# Patient Record
Sex: MALE | Race: BLACK OR AFRICAN AMERICAN | NOT HISPANIC OR LATINO
[De-identification: names, ages, dates, MRNs, and addresses within clinical notes are randomized per-mention and may not be internally consistent; named-entity substitution may affect disease eponyms.]

---

## 2018-04-22 ENCOUNTER — APPOINTMENT (OUTPATIENT)
Dept: ULTRASOUND IMAGING | Facility: HOSPITAL | Age: 43
End: 2018-04-22

## 2018-04-22 ENCOUNTER — OUTPATIENT (OUTPATIENT)
Dept: OUTPATIENT SERVICES | Facility: HOSPITAL | Age: 43
LOS: 1 days | Discharge: HOME | End: 2018-04-22
Payer: COMMERCIAL

## 2018-04-22 DIAGNOSIS — Q61.00 CONGENITAL RENAL CYST, UNSPECIFIED: ICD-10-CM

## 2018-04-22 PROCEDURE — 76775 US EXAM ABDO BACK WALL LIM: CPT | Mod: 26

## 2019-01-01 ENCOUNTER — EMERGENCY (EMERGENCY)
Facility: HOSPITAL | Age: 44
LOS: 0 days | Discharge: HOME | End: 2019-01-01
Attending: EMERGENCY MEDICINE
Payer: COMMERCIAL

## 2019-01-01 VITALS
HEIGHT: 73 IN | HEART RATE: 56 BPM | WEIGHT: 190.04 LBS | OXYGEN SATURATION: 99 % | RESPIRATION RATE: 18 BRPM | SYSTOLIC BLOOD PRESSURE: 126 MMHG | DIASTOLIC BLOOD PRESSURE: 74 MMHG | TEMPERATURE: 98 F

## 2019-01-01 DIAGNOSIS — M79.632 PAIN IN LEFT FOREARM: ICD-10-CM
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
## 2019-01-01 DIAGNOSIS — T78.40XA ALLERGY, UNSPECIFIED, INITIAL ENCOUNTER: ICD-10-CM

## 2019-01-01 DIAGNOSIS — L03.114 CELLULITIS OF LEFT UPPER LIMB: ICD-10-CM

## 2019-01-01 PROCEDURE — 99283 EMERGENCY DEPT VISIT LOW MDM: CPT

## 2019-01-01 RX ORDER — CEPHALEXIN 500 MG
1 CAPSULE ORAL
Qty: 20 | Refills: 0
Start: 2019-01-01 | End: 2019-01-05

## 2019-01-01 RX ORDER — DIPHENHYDRAMINE HCL 50 MG
1 CAPSULE ORAL
Qty: 9 | Refills: 0
Start: 2019-01-01 | End: 2019-01-03

## 2019-01-01 RX ORDER — CEPHALEXIN 500 MG
1 CAPSULE ORAL
Qty: 20 | Refills: 0 | OUTPATIENT
Start: 2019-01-01 | End: 2019-01-05

## 2019-01-01 RX ORDER — CEPHALEXIN 500 MG
500 CAPSULE ORAL ONCE
Qty: 0 | Refills: 0 | Status: COMPLETED | OUTPATIENT
Start: 2019-01-01 | End: 2019-01-01

## 2019-01-01 RX ORDER — DIPHENHYDRAMINE HCL 50 MG
1 CAPSULE ORAL
Qty: 9 | Refills: 0 | OUTPATIENT
Start: 2019-01-01 | End: 2019-01-03

## 2019-01-01 RX ADMIN — Medication 500 MILLIGRAM(S): at 19:02

## 2019-01-01 NOTE — ED PROVIDER NOTE - CARE PLAN
Principal Discharge DX:	Allergic reaction, initial encounter  Secondary Diagnosis:	Cellulitis of left upper extremity

## 2019-01-01 NOTE — ED PROVIDER NOTE - SKIN, MLM
left distal upper arm with small circular skin breakdown consistent with bite, mild surrounding edema extending to mid forearm, circular in nature, scant erythema, nontender

## 2019-01-01 NOTE — ED ADULT NURSE NOTE - CHIEF COMPLAINT QUOTE
swelling / redness to left arm since Saturday, believes he got bitten by "something on Saturday . denies pain

## 2019-01-01 NOTE — ED PROVIDER NOTE - OBJECTIVE STATEMENT
43M here with left forearm swelling x 2 days, reports a bite on upper arm while playing in the park 2 days ago, noted some swelling and itching extending to mid forearm. No pain. Mild redness. No fever, chills, nausea, vomiting. No other injury or weight lifting. No numbness, tingling or discoloration.

## 2019-01-01 NOTE — ED PROVIDER NOTE - MUSCULOSKELETAL, MLM
Spine appears normal, range of motion is not limited, no muscle or joint tenderness, soft compartments

## 2019-01-01 NOTE — ED PROVIDER NOTE - ATTENDING CONTRIBUTION TO CARE
43 year old male s/o forearm swelling x few days. PE:  left distal upper arm with small circular skin breakdown consistent with bite, mild surrounding edema extending to mid forearm, circular in nature, scant erythema, nontender. I&P: cellulitis vs allergic rxn, meds, PMD follow up

## 2020-02-29 ENCOUNTER — EMERGENCY (EMERGENCY)
Facility: HOSPITAL | Age: 45
LOS: 0 days | Discharge: HOME | End: 2020-03-01
Attending: STUDENT IN AN ORGANIZED HEALTH CARE EDUCATION/TRAINING PROGRAM | Admitting: INTERNAL MEDICINE
Payer: COMMERCIAL

## 2020-02-29 VITALS
SYSTOLIC BLOOD PRESSURE: 110 MMHG | RESPIRATION RATE: 17 BRPM | WEIGHT: 190.04 LBS | DIASTOLIC BLOOD PRESSURE: 74 MMHG | HEART RATE: 51 BPM | HEIGHT: 73 IN | OXYGEN SATURATION: 100 % | TEMPERATURE: 98 F

## 2020-02-29 DIAGNOSIS — R07.9 CHEST PAIN, UNSPECIFIED: ICD-10-CM

## 2020-02-29 LAB
ALBUMIN SERPL ELPH-MCNC: 3.4 G/DL — SIGNIFICANT CHANGE UP (ref 3.3–5)
ALP SERPL-CCNC: 71 U/L — SIGNIFICANT CHANGE UP (ref 40–120)
ALT FLD-CCNC: 28 U/L — SIGNIFICANT CHANGE UP (ref 12–78)
ANION GAP SERPL CALC-SCNC: 5 MMOL/L — SIGNIFICANT CHANGE UP (ref 5–17)
AST SERPL-CCNC: 19 U/L — SIGNIFICANT CHANGE UP (ref 15–37)
BILIRUB SERPL-MCNC: 0.8 MG/DL — SIGNIFICANT CHANGE UP (ref 0.2–1.2)
BUN SERPL-MCNC: 11 MG/DL — SIGNIFICANT CHANGE UP (ref 7–23)
CALCIUM SERPL-MCNC: 9 MG/DL — SIGNIFICANT CHANGE UP (ref 8.5–10.1)
CHLORIDE SERPL-SCNC: 108 MMOL/L — SIGNIFICANT CHANGE UP (ref 96–108)
CK MB BLD-MCNC: 0.9 % — SIGNIFICANT CHANGE UP (ref 0–3.5)
CK MB CFR SERPL CALC: 2.6 NG/ML — SIGNIFICANT CHANGE UP (ref 0.5–3.6)
CK SERPL-CCNC: 282 U/L — SIGNIFICANT CHANGE UP (ref 26–308)
CO2 SERPL-SCNC: 29 MMOL/L — SIGNIFICANT CHANGE UP (ref 22–31)
CREAT SERPL-MCNC: 1.15 MG/DL — SIGNIFICANT CHANGE UP (ref 0.5–1.3)
D DIMER BLD IA.RAPID-MCNC: 350 NG/ML DDU — HIGH
GLUCOSE SERPL-MCNC: 89 MG/DL — SIGNIFICANT CHANGE UP (ref 70–99)
HCT VFR BLD CALC: 40.4 % — SIGNIFICANT CHANGE UP (ref 39–50)
HGB BLD-MCNC: 13.5 G/DL — SIGNIFICANT CHANGE UP (ref 13–17)
MCHC RBC-ENTMCNC: 29.5 PG — SIGNIFICANT CHANGE UP (ref 27–34)
MCHC RBC-ENTMCNC: 33.4 GM/DL — SIGNIFICANT CHANGE UP (ref 32–36)
MCV RBC AUTO: 88.4 FL — SIGNIFICANT CHANGE UP (ref 80–100)
NRBC # BLD: 0 /100 WBCS — SIGNIFICANT CHANGE UP (ref 0–0)
PLATELET # BLD AUTO: 168 K/UL — SIGNIFICANT CHANGE UP (ref 150–400)
POTASSIUM SERPL-MCNC: 3.8 MMOL/L — SIGNIFICANT CHANGE UP (ref 3.5–5.3)
POTASSIUM SERPL-SCNC: 3.8 MMOL/L — SIGNIFICANT CHANGE UP (ref 3.5–5.3)
PROT SERPL-MCNC: 6.7 GM/DL — SIGNIFICANT CHANGE UP (ref 6–8.3)
RBC # BLD: 4.57 M/UL — SIGNIFICANT CHANGE UP (ref 4.2–5.8)
RBC # FLD: 11.7 % — SIGNIFICANT CHANGE UP (ref 10.3–14.5)
SODIUM SERPL-SCNC: 142 MMOL/L — SIGNIFICANT CHANGE UP (ref 135–145)
TROPONIN I SERPL-MCNC: 0.03 NG/ML — SIGNIFICANT CHANGE UP (ref 0.01–0.04)
WBC # BLD: 6.86 K/UL — SIGNIFICANT CHANGE UP (ref 3.8–10.5)
WBC # FLD AUTO: 6.86 K/UL — SIGNIFICANT CHANGE UP (ref 3.8–10.5)

## 2020-02-29 PROCEDURE — 71275 CT ANGIOGRAPHY CHEST: CPT | Mod: 26

## 2020-02-29 PROCEDURE — 99217: CPT

## 2020-02-29 PROCEDURE — 99285 EMERGENCY DEPT VISIT HI MDM: CPT

## 2020-02-29 PROCEDURE — 93010 ELECTROCARDIOGRAM REPORT: CPT

## 2020-02-29 RX ORDER — ASPIRIN/CALCIUM CARB/MAGNESIUM 324 MG
325 TABLET ORAL ONCE
Refills: 0 | Status: COMPLETED | OUTPATIENT
Start: 2020-02-29 | End: 2020-02-29

## 2020-02-29 RX ADMIN — Medication 325 MILLIGRAM(S): at 23:54

## 2020-02-29 NOTE — ED ADULT NURSE NOTE - ED STAT RN HANDOFF DETAILS
received pt from primary RN Danya. Pt alert and oriented x 4 and aware of staffing change. will continue to monitor awaiting placement on unit.

## 2020-02-29 NOTE — H&P ADULT - NSHPLABSRESULTS_GEN_ALL_CORE
Recent Vitals  T(C): 36.6 (02-29-20 @ 19:28), Max: 36.6 (02-29-20 @ 19:28)  HR: 52 (02-29-20 @ 23:00) (51 - 52)  BP: 122/80 (02-29-20 @ 23:00) (110/74 - 122/80)  RR: 17 (02-29-20 @ 19:28) (17 - 17)  SpO2: 100% (02-29-20 @ 19:28) (100% - 100%)                        13.5   6.86  )-----------( 168      ( 29 Feb 2020 20:15 )             40.4     02-29    142  |  108  |  11  ----------------------------<  89  3.8   |  29  |  1.15    Ca    9.0      29 Feb 2020 20:15    TPro  6.7  /  Alb  3.4  /  TBili  0.8  /  DBili  x   /  AST  19  /  ALT  28  /  AlkPhos  71  02-29      LIVER FUNCTIONS - ( 29 Feb 2020 20:15 )  Alb: 3.4 g/dL / Pro: 6.7 gm/dL / ALK PHOS: 71 U/L / ALT: 28 U/L / AST: 19 U/L / GGT: x               Home Medications:

## 2020-02-29 NOTE — ED PROVIDER NOTE - CLINICAL SUMMARY MEDICAL DECISION MAKING FREE TEXT BOX
pt presented today c/o midsternal chest pain radiating into his left back while playing soccer with mild sob and nausea,

## 2020-02-29 NOTE — ED ADULT NURSE NOTE - NSIMPLEMENTINTERV_GEN_ALL_ED
Implemented All Universal Safety Interventions:  [place] to call system. Call bell, personal items and telephone within reach. Instruct patient to call for assistance. Room bathroom lighting operational. Non-slip footwear when patient is off stretcher. Physically safe environment: no spills, clutter or unnecessary equipment. Stretcher in lowest position, wheels locked, appropriate side rails in place.

## 2020-02-29 NOTE — ED PROVIDER NOTE - OBJECTIVE STATEMENT
44 year old male with no pertinent past medical history presents with complains of chest pain. pt states pain started today around 9am while he was playing soccer. pain located in the mid chest radiating to the left side back. at the time of beginning symptoms he rated pain an 8/10 . at this time he rates his pain 4/10. Usually this happens to him and pays it no mind but today he was unable to catch his breath and felt different but did continue to play. 44 year old male with no pertinent past medical history presents with complains of chest pain. pt states pain started this morning while he was playing soccer, he was playing for about 45 minutes when he experienced pain located in the mid chest radiating to the left side of his back, at the time his pain was an 8/10, at this time he rates his pain 4/10, he has experienced this pain in the past but states that it doesn't last too long and has not paid too much attention to it, however,  today he was unable to catch his breath and felt that the pain lasted longer than usual, he was able to finish playing, he did feel nauseous after playing which has resolved, he did go home and rested but continued to feel the pain again at home which brought him in today (-) diaphoresis (-) vomiting (-) dizzy or lightheaded (-) leg edema (-) calf pains (-) recent travel

## 2020-02-29 NOTE — ED ADULT NURSE NOTE - OBJECTIVE STATEMENT
received er bed 5 c/o pain midsternal area intermittently since 1000 radiating to upper back onset while playing soccer with some shortness of breath lungs clear b/l skin warm, dry color good

## 2020-02-29 NOTE — H&P ADULT - HISTORY OF PRESENT ILLNESS
Patient is a 43 YO M with no PMH who presents to the ED for chest pain.  Patient reports he was playing soccer when after about 40 minutes he developed substernal CP that radiated to the L side of his back, unable to catch his breath.  Patient reports that he has had similar chest pain since June of last year but today the pain persisted longer than usual so he presented to the ED.  Reports nausea associated with the pain.  Pain rated 8/10 at its worst, currently has no complaints of pain.  Patient states that he developed the same pain again at home.  Denies hx of vomiting, diaphoresis,  dizziness, lightheadedness, syncope.  Reports being active multiple times a week, nonsmoker, NKDA, social drinker, no recreational drugs.

## 2020-02-29 NOTE — ED ADULT TRIAGE NOTE - CHIEF COMPLAINT QUOTE
Chest pain , mid sternal , radiating to left side of upper back . Onset 12 noon . Patient was playing soccer when pain  started.

## 2020-02-29 NOTE — H&P ADULT - PROBLEM SELECTOR PLAN 1
ASA 325mg po given  lipid panel  Troponin level x 2 q 3 hrs   Cardio eval in am ASA 325mg po given  lipid panel  Troponin level x 2 q 3 hrs   Cardio eval in am - Dr Newell

## 2020-02-29 NOTE — H&P ADULT - ASSESSMENT
44M with no PMH presents for worsening CP radiating to the L side.  EKG and labs benign.  CT angio negative.  Troponin mildly elevated.  Patient reports worsening of symptoms.  Will place patient in CP obs for serial troponins    IMPROVE VTE Individual Risk Assessment          RISK                                                          Points  [  ] Previous VTE                                                3  [  ] Thrombophilia                                             2  [  ] Lower limb paralysis                                    2        (unable to hold up >15 seconds)    [  ] Current Cancer                                             2         (within 6 months)  [  ] Immobilization > 24 hrs                              1  [  ] ICU/CCU stay > 24 hours                            1  [  ] Age > 60                                                    1    IMPROVE VTE Score - 0

## 2020-03-01 ENCOUNTER — TRANSCRIPTION ENCOUNTER (OUTPATIENT)
Age: 45
End: 2020-03-01

## 2020-03-01 VITALS
OXYGEN SATURATION: 97 % | SYSTOLIC BLOOD PRESSURE: 129 MMHG | RESPIRATION RATE: 17 BRPM | HEART RATE: 93 BPM | TEMPERATURE: 99 F | DIASTOLIC BLOOD PRESSURE: 73 MMHG

## 2020-03-01 LAB
CHOLEST SERPL-MCNC: 168 MG/DL — SIGNIFICANT CHANGE UP (ref 10–199)
HDLC SERPL-MCNC: 52 MG/DL — SIGNIFICANT CHANGE UP
LIPID PNL WITH DIRECT LDL SERPL: 108 MG/DL — HIGH
TOTAL CHOLESTEROL/HDL RATIO MEASUREMENT: 3.3 RATIO — LOW (ref 3.4–9.6)
TRIGL SERPL-MCNC: 46 MG/DL — SIGNIFICANT CHANGE UP (ref 10–149)
TROPONIN I SERPL-MCNC: 0.01 NG/ML — SIGNIFICANT CHANGE UP (ref 0.01–0.04)
TROPONIN I SERPL-MCNC: 0.02 NG/ML — SIGNIFICANT CHANGE UP (ref 0.01–0.04)

## 2020-03-01 PROCEDURE — 99219: CPT

## 2020-03-01 NOTE — DISCHARGE NOTE PROVIDER - PROVIDER TOKENS
FREE:[LAST:[Your PCP in a week],PHONE:[(   )    -],FAX:[(   )    -]],PROVIDER:[TOKEN:[[id number]],FOLLOWUP:[7-10 Days]]

## 2020-03-01 NOTE — DISCHARGE NOTE PROVIDER - HOSPITAL COURSE
44 year old male with no significant PMH was presented with chest pain. CT angio ruled out any acute pulm pathology. tele did not show any significant arrhythmias. ACS was ruled out with serial troponin. Patient now chest pain free on exertion and at rest. Discharged home in stable condition. can follow cardiologist as outpatient. patient verbalized agreement.     1. chest pain unspecified.         PHYSICAL EXAM:        GENERAL: well built, well nourished    CHEST/LUNG: Clear to ausculation bilaterally, no wheezing, no crackles     HEART: Regular rate and rhythm; No murmurs, rubs    ABDOMEN: Soft, Nontender, Nondistended; Bowel sounds present    EXTREMITIES:  Moving all four extremities spontaneously, No clubbing, cyanosis, or edema    NERVOUS SYSTEM:  Grossly non focal.    Psychiatry: AAO x 3, mood is appropriate         OBJECTIVE DATA:     Vital Signs Last 24 Hrs    T(C): 36.7 (01 Mar 2020 09:30), Max: 37.2 (01 Mar 2020 05:33)    T(F): 98.1 (01 Mar 2020 09:30), Max: 99 (01 Mar 2020 05:33)    HR: 92 (01 Mar 2020 09:30) (49 - 92)    BP: 119/75 (01 Mar 2020 09:30) (110/74 - 122/80)    BP(mean): --    RR: 17 (01 Mar 2020 09:30) (16 - 17)    SpO2: 98% (01 Mar 2020 09:30) (97% - 100%)           Daily Height in cm: 185.42 (29 Feb 2020 19:28)      Daily     LABS:                            13.5     6.86  )-----------( 168      ( 29 Feb 2020 20:15 )               40.4               02-29        142  |  108  |  11    ----------------------------<  89    3.8   |  29  |  1.15        Ca    9.0      29 Feb 2020 20:15        TPro  6.7  /  Alb  3.4  /  TBili  0.8  /  DBili  x   /  AST  19  /  ALT  28  /  AlkPhos  71  02-29

## 2020-03-01 NOTE — CHART NOTE - NSCHARTNOTEFT_GEN_A_CORE
To whom it may concern:    Mr. Sam Quiroz was admitted to [facility] on 2/29 and discharged 3/1.  He may return to work without restriction on 3/3/20.    MONAE Stiles, PA-C  Department of Medicine  [facility]  [phone]

## 2020-03-01 NOTE — DISCHARGE NOTE PROVIDER - CARE PROVIDER_API CALL
Your PCP in a week,   Phone: (   )    -  Fax: (   )    -  Follow Up Time:     Kurt Newell)  Cardiology  [address], [place] [address]  Phone: [phone]  Fax: [phone]  Follow Up Time: 7-10 Days

## 2020-03-01 NOTE — DISCHARGE NOTE NURSING/CASE MANAGEMENT/SOCIAL WORK - PATIENT PORTAL LINK FT
You can access the FollowMyHealth Patient Portal offered by [facility] by registering at the following website: [email or web address]. By joining [employer] [facility]’s FollowMyHealth portal, you will also be able to view your health information using other applications (apps) compatible with our system.

## 2020-03-01 NOTE — DISCHARGE NOTE PROVIDER - CARE PROVIDERS DIRECT ADDRESSES
,DirectAddress_Unknown,[email or web address]

## 2020-03-01 NOTE — CONSULT NOTE ADULT - SUBJECTIVE AND OBJECTIVE BOX
HPI:  HPI:  Patient is a 45 YO M with no PMH who presents to the ED for chest pain.  Patient reports he was playing soccer when after about 40 minutes he developed substernal CP that radiated to the L side of his back, unable to catch his breath.  Patient reports that he has had similar chest pain since June of last year but today the pain persisted longer than usual so he presented to the ED.  Reports nausea associated with the pain.  Pain rated 8/10 at its worst, currently has no complaints of pain.  Patient states that he developed the same pain again at home.  Denies hx of vomiting, diaphoresis,  dizziness, lightheadedness, syncope.  Reports being active multiple times a week, nonsmoker, NKDA, social drinker, no recreational drugs. (29 Feb 2020 23:41)      Chief Complaint:  Patient is a 44y old  Male who presents with a chief complaint of cp (01 Mar 2020 10:38)      Review of Systems:    General:  No wt loss, fevers, chills, night sweats  Eyes:  Good vision, no reported pain  ENT:  No sore throat, pain, runny nose, dysphagia  CV:  No pain, palpitations, hypo/hypertension  Resp:  No dyspnea, cough, tachypnea, wheezing  GI:  No pain, nausea, vomiting, diarrhea, constipation  :  No pain, bleeding, incontinence, nocturia  Muscle:  No pain, weakness  Breast:  No pain, abscess, mass, discharge  Neuro:  No weakness, tingling, memory problems  Psych:  No fatigue, insomnia, mood problems, depression  Endocrine:  No polyuria, polydypsia, cold/heat intolerance  Heme:  No petechiae, ecchymosis, easy bruisability  Skin:  No rash, tattoos, scars, edema         Social History/Family History  SOCHX:   tobacco,  -  alcohol    FMHX: FA/MO  - contributory       Discussed with:  PMD, Family    Physical Exam:    Vital Signs:  Vital Signs Last 24 Hrs  T(C): 36.1 (01 Mar 2020 17:40), Max: 37.2 (01 Mar 2020 05:33)  T(F): 97 (01 Mar 2020 17:40), Max: 99 (01 Mar 2020 05:33)  HR: 45 (01 Mar 2020 17:40) (45 - 92)  BP: 129/73 (01 Mar 2020 17:40) (113/76 - 129/73)  BP(mean): --  RR: 17 (01 Mar 2020 17:40) (16 - 17)  SpO2: 97% (01 Mar 2020 17:40) (97% - 100%)  Daily     Daily   I&O's Summary    01 Mar 2020 07:01  -  01 Mar 2020 21:52  --------------------------------------------------------  IN: 120 mL / OUT: 0 mL / NET: 120 mL        General:  Appears stated age, well-groomed, well-nourished, no distress  HEENT:  NC/AT, patent nares w/ pink mucosa, OP clear w/o lesions, PERRL, EOMI, conjunctivae clear, no thyromegaly, nodules, adenopathy, no JVD  Chest:  Full & symmetric excursion, no increased effort, breath sounds clear  Cardiovascular:  Regular rhythm, S1, S2, no murmur/rub/S3/S4, no carotid/femoral/abdominal bruit, radial/pedal pulses 2+, no edema  Abdomen:  Soft, non-tender, non-distended, normoactive bowel sounds, no HSM  Extremities:  Gait & station:   Digits:   Nails:   Joints, Bones, Muscles:   ROM:   Stability:  Skin:  No rash/erythema/ecchymoses/petechiae/wounds/abscess/warm/dry  Musculoskeletal:  Full ROM in all joints w/o swelling/tenderness/effusion  Neuro/Psych:  Alert, oriented, normal and symmetric strength in UEs, LEs, normal gait, sensation intact, affect:   mood:   appearance:       Laboratory:                          13.5   6.86  )-----------( 168      ( 29 Feb 2020 20:15 )             40.4     02-29    142  |  108  |  11  ----------------------------<  89  3.8   |  29  |  1.15    Ca    9.0      29 Feb 2020 20:15    TPro  6.7  /  Alb  3.4  /  TBili  0.8  /  DBili  x   /  AST  19  /  ALT  28  /  AlkPhos  71  02-29      CARDIAC MARKERS ( 01 Mar 2020 07:56 )  .015 ng/mL / x     / x     / x     / x      CARDIAC MARKERS ( 01 Mar 2020 00:55 )  .020 ng/mL / x     / x     / x     / x      CARDIAC MARKERS ( 29 Feb 2020 20:15 )  .025 ng/mL / x     / 282 U/L / x     / 2.6 ng/mL      CAPILLARY BLOOD GLUCOSE        LIVER FUNCTIONS - ( 29 Feb 2020 20:15 )  Alb: 3.4 g/dL / Pro: 6.7 gm/dL / ALK PHOS: 71 U/L / ALT: 28 U/L / AST: 19 U/L / GGT: x               Assessment:  the patient comes after having chest pain during a soccer match  The patient's chest pain improved and is now resolved  The patient soccer match was outdoors and in cold weather  This very likely may be cold-induced asthma  Cardiac enzymes are normal  EKG is without new changes  The patient may be safely discharged and have a cardiovascular stress test as an outpatient

## 2021-05-16 ENCOUNTER — EMERGENCY (EMERGENCY)
Facility: HOSPITAL | Age: 46
LOS: 0 days | Discharge: HOME | End: 2021-05-16
Payer: COMMERCIAL

## 2021-05-16 VITALS
HEIGHT: 73 IN | WEIGHT: 179.9 LBS | HEART RATE: 64 BPM | OXYGEN SATURATION: 100 % | DIASTOLIC BLOOD PRESSURE: 69 MMHG | SYSTOLIC BLOOD PRESSURE: 115 MMHG | TEMPERATURE: 98 F | RESPIRATION RATE: 18 BRPM

## 2021-05-16 DIAGNOSIS — M25.562 PAIN IN LEFT KNEE: ICD-10-CM
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
## 2021-05-16 DIAGNOSIS — S83.8X2A SPRAIN OF OTHER SPECIFIED PARTS OF LEFT KNEE, INITIAL ENCOUNTER: ICD-10-CM
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
## 2021-05-16 PROCEDURE — 73562 X-RAY EXAM OF KNEE 3: CPT | Mod: 26,LT

## 2021-05-16 PROCEDURE — 99283 EMERGENCY DEPT VISIT LOW MDM: CPT

## 2021-05-16 RX ORDER — OXYCODONE AND ACETAMINOPHEN 5; 325 MG/1; MG/1
1 TABLET ORAL ONCE
Refills: 0 | Status: DISCONTINUED | OUTPATIENT
Start: 2021-05-16 | End: 2021-05-16

## 2021-05-16 RX ORDER — IBUPROFEN 200 MG
1 TABLET ORAL
Qty: 9 | Refills: 0
Start: 2021-05-16 | End: 2021-05-18

## 2021-05-16 RX ORDER — IBUPROFEN 200 MG
600 TABLET ORAL ONCE
Refills: 0 | Status: COMPLETED | OUTPATIENT
Start: 2021-05-16 | End: 2021-05-16

## 2021-05-16 RX ADMIN — OXYCODONE AND ACETAMINOPHEN 1 TABLET(S): 5; 325 TABLET ORAL at 12:21

## 2021-05-16 RX ADMIN — Medication 600 MILLIGRAM(S): at 12:21

## 2021-05-16 NOTE — ED PROVIDER NOTE - PROGRESS NOTE DETAILS
large effusion on xray, no acute fracture, discussed with patient concerned for ligamentous or meniscus injury, placed in knee immobilizer and will f/u with orthopedics.

## 2021-05-16 NOTE — ED PROVIDER NOTE - PATIENT PORTAL LINK FT
You can access the FollowMyHealth Patient Portal offered by [facility] by registering at the following website: [email or web address]. By joining [employer]’s FollowMyHealth portal, you will also be able to view your health information using other applications (apps) compatible with our system.

## 2021-05-16 NOTE — ED PROVIDER NOTE - CARE PROVIDER_API CALL
Raffi Vazquez (DO)  Orthopaedic Surgery  [address] [place] [address]  Phone: [phone]  Fax: [phone]  Follow Up Time:

## 2021-05-16 NOTE — ED PROVIDER NOTE - CLINICAL SUMMARY MEDICAL DECISION MAKING FREE TEXT BOX
patient here following knee injury, consider fracture vs ligamentous injury recommend xray, analgesia, reassess

## 2021-05-16 NOTE — ED PROVIDER NOTE - OBJECTIVE STATEMENT
45M here with left knee pain after injury while playing soccer yesterday, another player collided with him. Since that time has had pain and swelling, pain with ambulation. No numbness or weakness. Denies other injury. had one Aleve yesterday with minimal relief.

## 2022-07-20 ENCOUNTER — APPOINTMENT (OUTPATIENT)
Dept: UROLOGY | Facility: CLINIC | Age: 47
End: 2022-07-20

## 2022-07-20 VITALS
DIASTOLIC BLOOD PRESSURE: 77 MMHG | TEMPERATURE: 98.1 F | WEIGHT: 194 LBS | SYSTOLIC BLOOD PRESSURE: 136 MMHG | HEART RATE: 71 BPM | BODY MASS INDEX: 25.71 KG/M2 | OXYGEN SATURATION: 98 % | HEIGHT: 73 IN

## 2022-07-20 PROCEDURE — 99204 OFFICE O/P NEW MOD 45 MIN: CPT

## 2022-07-20 RX ORDER — OXICONAZOLE NITRATE 10 MG/G
1 CREAM TOPICAL
Qty: 60 | Refills: 0 | Status: DISCONTINUED | COMMUNITY
Start: 2022-04-01

## 2022-07-20 RX ORDER — MINOXIDIL 2.5 MG/1
2.5 TABLET ORAL
Qty: 60 | Refills: 0 | Status: ACTIVE | COMMUNITY
Start: 2022-06-14

## 2022-07-20 RX ORDER — ERGOCALCIFEROL 1.25 MG/1
1.25 MG CAPSULE, LIQUID FILLED ORAL
Qty: 4 | Refills: 0 | Status: ACTIVE | COMMUNITY
Start: 2022-06-28

## 2022-07-20 RX ORDER — CLOBETASOL PROPIONATE 0.5 MG/ML
0.05 SOLUTION TOPICAL
Qty: 50 | Refills: 0 | Status: DISCONTINUED | COMMUNITY
Start: 2022-05-26

## 2022-07-20 RX ORDER — CLINDAMYCIN PHOSPHATE 10 MG/ML
1 SOLUTION TOPICAL
Qty: 60 | Refills: 0 | Status: DISCONTINUED | COMMUNITY
Start: 2022-02-14

## 2022-07-20 RX ORDER — CICLOPIROX 10 MG/.96ML
1 SHAMPOO TOPICAL
Qty: 120 | Refills: 0 | Status: DISCONTINUED | COMMUNITY
Start: 2022-04-01

## 2022-07-20 NOTE — HISTORY OF PRESENT ILLNESS
[FreeTextEntry1] : 07/20/2022: Mr. DE LA CRUZ is a 46 year male who presents today for elevated psa. He got his PSA drawn by PCP. States it is 7.0 ng/mL about 2 weeks ago. Few years back his PSA was normal.\par No urinary symptoms,. No family h/o Ca prostate.\par POLY: No nodule palpable. \par \par PSA drawn today. UA and Culture also. \par \par RTC 2 weeks to check reports

## 2022-07-20 NOTE — PHYSICAL EXAM
[Urethral Meatus] : meatus normal [Penis Abnormality] : normal uncircumcised penis [General Appearance - Well Developed] : well developed [General Appearance - Well Nourished] : well nourished [Normal Appearance] : normal appearance [Well Groomed] : well groomed [General Appearance - In No Acute Distress] : no acute distress [Edema] : no peripheral edema [Respiration, Rhythm And Depth] : normal respiratory rhythm and effort [Exaggerated Use Of Accessory Muscles For Inspiration] : no accessory muscle use [Abdomen Soft] : soft [Abdomen Tenderness] : non-tender [Costovertebral Angle Tenderness] : no ~M costovertebral angle tenderness [Urinary Bladder Findings] : the bladder was normal on palpation [Scrotum] : the scrotum was normal [Epididymis] : the epididymides were normal [Testes Tenderness] : no tenderness of the testes [Testes Mass (___cm)] : there were no testicular masses [Anus Abnormality] : the anus and perineum were normal [Rectal Exam - Rectum] : digital rectal exam was normal [Prostate Tenderness] : the prostate was not tender [No Prostate Nodules] : no prostate nodules [Prostate Size ___ (0-4)] : prostate size [unfilled] (scale: 0-4) [Normal Station and Gait] : the gait and station were normal for the patient's age [No Focal Deficits] : no focal deficits [] : no rash [Oriented To Time, Place, And Person] : oriented to person, place, and time [Affect] : the affect was normal [Mood] : the mood was normal [Not Anxious] : not anxious [No Palpable Adenopathy] : no palpable adenopathy

## 2022-07-20 NOTE — LETTER BODY
[Dear  ___] : Dear  [unfilled], [Consult Letter:] : I had the pleasure of evaluating your patient, [unfilled]. [Please see my note below.] : Please see my note below. [Consult Closing:] : Thank you very much for allowing me to participate in the care of this patient.  If you have any questions, please do not hesitate to contact me. [Sincerely,] : Sincerely, [FreeTextEntry3] : Aries Bryan MD\par

## 2022-07-20 NOTE — REVIEW OF SYSTEMS
[Loss of interest] : loss of interest in sexual activity [Depression] : depression [Negative] : Heme/Lymph [see HPI] : see HPI

## 2022-07-21 LAB
APPEARANCE: CLEAR
BILIRUBIN URINE: NEGATIVE
BLOOD URINE: NEGATIVE
COLOR: YELLOW
GLUCOSE QUALITATIVE U: NEGATIVE
KETONES URINE: NEGATIVE
LEUKOCYTE ESTERASE URINE: NEGATIVE
NITRITE URINE: NEGATIVE
PH URINE: 7
PROTEIN URINE: NORMAL
SPECIFIC GRAVITY URINE: 1.02
UROBILINOGEN URINE: NORMAL

## 2022-07-25 LAB — BACTERIA UR CULT: NORMAL

## 2022-07-27 LAB — PSA SERPL-MCNC: 7.35 NG/ML

## 2022-08-23 ENCOUNTER — APPOINTMENT (OUTPATIENT)
Dept: UROLOGY | Facility: CLINIC | Age: 47
End: 2022-08-23

## 2022-08-23 VITALS
SYSTOLIC BLOOD PRESSURE: 118 MMHG | OXYGEN SATURATION: 99 % | TEMPERATURE: 98.7 F | WEIGHT: 194 LBS | BODY MASS INDEX: 25.6 KG/M2 | HEART RATE: 75 BPM | DIASTOLIC BLOOD PRESSURE: 75 MMHG

## 2022-08-23 PROCEDURE — 99213 OFFICE O/P EST LOW 20 MIN: CPT

## 2022-08-23 NOTE — PHYSICAL EXAM

## 2022-08-23 NOTE — HISTORY OF PRESENT ILLNESS
[FreeTextEntry1] : 07/20/2022: Mr. DE LA CRUZ is a 46 year male who presents today for elevated psa. He got his PSA drawn by PCP. States it is 7.0 ng/mL about 2 weeks ago. Few years back his PSA was normal.\par No urinary symptoms,. No family h/o Ca prostate.\par POLY: No nodule palpable. \par \par PSA drawn today. UA and Culture also. \par \par RTC 2 weeks to check reports\par \par [id number]: repeat PSA 7.35 ng/ml. POLY: no nodules. Will schedule for MR of the prostate\par RTC: 3 weeks to check MRI

## 2022-08-23 NOTE — REVIEW OF SYSTEMS
[see HPI] : see HPI [Loss of interest] : loss of interest in sexual activity [Depression] : depression [Negative] : Heme/Lymph

## 2022-09-03 ENCOUNTER — OUTPATIENT (OUTPATIENT)
Dept: OUTPATIENT SERVICES | Facility: HOSPITAL | Age: 47
LOS: 1 days | End: 2022-09-03
Payer: COMMERCIAL

## 2022-09-03 ENCOUNTER — APPOINTMENT (OUTPATIENT)
Dept: MRI IMAGING | Facility: IMAGING CENTER | Age: 47
End: 2022-09-03

## 2022-09-03 ENCOUNTER — RESULT REVIEW (OUTPATIENT)
Age: 47
End: 2022-09-03

## 2022-09-03 DIAGNOSIS — R97.20 ELEVATED PROSTATE SPECIFIC ANTIGEN [PSA]: ICD-10-CM

## 2022-09-03 PROCEDURE — 76498P: CUSTOM | Mod: 26

## 2022-09-03 PROCEDURE — 72197 MRI PELVIS W/O & W/DYE: CPT

## 2022-09-03 PROCEDURE — 76498 UNLISTED MR PROCEDURE: CPT

## 2022-09-03 PROCEDURE — A9585: CPT

## 2022-09-03 PROCEDURE — 72197 MRI PELVIS W/O & W/DYE: CPT | Mod: 26

## 2022-09-21 ENCOUNTER — APPOINTMENT (OUTPATIENT)
Dept: UROLOGY | Facility: CLINIC | Age: 47
End: 2022-09-21

## 2022-09-21 VITALS
DIASTOLIC BLOOD PRESSURE: 75 MMHG | SYSTOLIC BLOOD PRESSURE: 121 MMHG | HEART RATE: 59 BPM | TEMPERATURE: 98.2 F | OXYGEN SATURATION: 97 %

## 2022-09-21 DIAGNOSIS — R97.20 ELEVATED PROSTATE, SPECIFIC ANTIGEN [PSA]: ICD-10-CM

## 2022-09-21 PROCEDURE — 99213 OFFICE O/P EST LOW 20 MIN: CPT

## 2022-09-22 PROBLEM — R97.20 ELEVATED PSA: Status: ACTIVE | Noted: 2022-07-20

## 2022-09-22 NOTE — HISTORY OF PRESENT ILLNESS
[FreeTextEntry1] : 07/20/2022: Mr. DE LA CRUZ is a 46 year male who presents today for elevated psa. He got his PSA drawn by PCP. States it is 7.0 ng/mL about 2 weeks ago. Few years back his PSA was normal.\par No urinary symptoms,. No family h/o Ca prostate.\par POLY: No nodule palpable. \par \par PSA drawn today. UA and Culture also. \par \par RTC 2 weeks to check reports\par \par 08/23/82022: repeat PSA 7.35 ng/ml. POLY: no nodules. Will schedule for MR of the prostate\par RTC: 3 weeks to check MRI\par \par 09/21/2022: Elevated PSA: MRI: PIRADS Lesion 3. no palpable  nodule. will follow PSA in 2 months. Explained about the EPSA again. \par \par RTC 2 months to check PSA

## 2022-11-22 ENCOUNTER — APPOINTMENT (OUTPATIENT)
Dept: UROLOGY | Facility: CLINIC | Age: 47
End: 2022-11-22

## 2022-12-27 LAB — PSA SERPL-MCNC: 10 NG/ML

## 2023-02-08 ENCOUNTER — APPOINTMENT (OUTPATIENT)
Dept: UROLOGY | Facility: CLINIC | Age: 48
End: 2023-02-08
